# Patient Record
(demographics unavailable — no encounter records)

---

## 2024-12-17 NOTE — ADDENDUM
[FreeTextEntry1] : This note was written by Gurdeep Pelaez, acting as the  for Dr. Tatum on 12/17/2024. This note accurately reflects the work and decisions made by Dr. Tatum.

## 2024-12-17 NOTE — DISCUSSION/SUMMARY
[Medication Risks Reviewed] : Medication risks reviewed [Surgical risks reviewed] : Surgical risks reviewed [de-identified] : Patient is a 64-year-old male with mild left knee osteoarthritis presenting today for initial evaluation.  Is not a try conservative treatment I think is warranted at this time.  I gave him prescription physical therapy.  We discussed low-impact activity exercise.  I recommended meloxicam 15 mg daily as needed for pain.  I will see him back on an as-needed basis for his left knee.  All questions were asked and answered

## 2024-12-17 NOTE — HISTORY OF PRESENT ILLNESS
[de-identified] : This is a 64 year old M pt presents today for left knee pain. Pt states pain is radiating down the leg since long time ago and it comes and goes. Pt  denies swelling. He takes Tylenol that provides some relief. Pt has not tried prior PT or injections. he is not ambulating with a cane or assisted device. no other constitutional sign or symptoms.

## 2024-12-17 NOTE — REASON FOR VISIT
[Initial Visit] : an initial visit for [FreeTextEntry2] : left knee pain [Interpreters_IDNumber] : [id number] [Interpreters_FullName] : julian [TWNoteComboBox1] : [place]

## 2024-12-17 NOTE — PHYSICAL EXAM
[de-identified] : GENERAL APPEARANCE: Well nourished and hydrated, pleasant, alert, and oriented x 3. Appears their stated age. HEENT: Normocephalic, extraocular eye motion intact. Nasal septum midline. Oral cavity clear. External auditory canal clear. RESPIRATORY: Breath sounds clear and audible in all lobes. No wheezing, No accessory muscle use. CARDIOVASCULAR: No apparent abnormalities. No lower leg edema. No varicosities. Pedal pulses are palpable. NEUROLOGIC: Sensation is normal, no muscle weakness in the upper or lower extremities. DERMATOLOGIC: No apparent skin lesions, moist, warm, no rash. SPINE: Cervical spine appears normal and moves freely; thoracic spine appears normal and moves freely; lumbosacral spine appears normal and moves freely, normal, nontender. MUSCULOSKELETAL: Hands, wrists, and elbows are normal and move freely, shoulders are normal and move freely. PSYCHIATRIC: Oriented to person, place, and time, insight and judgement were intact and the affect was normal. DTRs: Biceps, brachioradialis, triceps, patellar, ankle and plantar 2+ and symmetric bilaterally. Pulses: dorsalis pedis, posterior tibial, femoral, popliteal, and radial 2+ and symmetric bilaterally. Constitutional: Alert and in no acute distress, but well-appearing. [de-identified] : left knee exam shows no effusion, ROM is 0 to 130 degrees, no instability, no pain with Concepcion, no joint line tenderness. 5/5 motor strength in bilateral lower extremities. Sensory: Intact in bilateral lower extremities.  [de-identified] : 4 views of the left knee obtained the office today show no acute fracture or dislocation.  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  Bipartite patella noted

## 2025-02-27 NOTE — ASSESSMENT
[FreeTextEntry1] : ASSESSMENT: The patient comes in today with chronic exacerbated complaints of bilateral hand discomfort stiffness and tendinopathy.  With this in mind we have discussed treatment options.  We have discussed risk and benefits of corticosteroid injection and possibility of glucose elevation.  He does elect to proceed with injections.   The patient was adequately and thoroughly informed of my assessment of their current condition(s).  - This may diminish bodily function for the extremity. We discussed prognosis, tx modalities including operative and nonoperative options for the above diagnostic assessment. As always, 2nd opinion is always provided as an option.  When accessible, I was able to review other physicians note(s) including reviewing other tests, imaging results as well as personally view these results for my own interpretation.   Injection:   The risks and benefits of a steroid injection were discussed in detail. The risks include but are not limited to: pain, infection, swelling, flare response, bleeding, subcutaneous fat atrophy, skin depigmentation and/or elevation of blood sugar. The risk of incomplete resolution of symptoms, recurrence and additional intervention was reviewed and considered by the patient. The patient agreed to proceed and under a sterile prep, I injected 1 unit 6mg into 1 cc of a combination of Celestone and Lidocaine into the left index trigger, right ring trigger, right small trigger. The patient tolerated the injection well.   The patient was adequately and thoroughly informed of my assessment of their current condition(s).  DISCUSSION: 1.  Injections as above.  Activity modification.  Risk of recurrence 2. [x] 3. [x]

## 2025-02-27 NOTE — HISTORY OF PRESENT ILLNESS
[FreeTextEntry1] : Aiden is a very pleasant 64-year-old male presenting today with a longstanding history of bilateral hand discomfort swelling stiffness and difficulty with ADLs.  Denies trauma

## 2025-02-27 NOTE — PHYSICAL EXAM
[de-identified] : Examination of the hand(s)  particularly at the A1 of the left index, right ring, right small reveals tenderness with a palpable click. [de-identified] : [4] views of [bilateral hands and wrists] were obtained today in my office and were seen by me and discussed with the patient.  These [show findings consistent with bilateral basal joint OA and findings of IP joint OA]